# Patient Record
Sex: FEMALE | Race: BLACK OR AFRICAN AMERICAN | Employment: FULL TIME | URBAN - METROPOLITAN AREA
[De-identification: names, ages, dates, MRNs, and addresses within clinical notes are randomized per-mention and may not be internally consistent; named-entity substitution may affect disease eponyms.]

---

## 2021-09-15 ENCOUNTER — HOSPITAL ENCOUNTER (EMERGENCY)
Facility: HOSPITAL | Age: 36
Discharge: HOME | End: 2021-09-15
Attending: EMERGENCY MEDICINE
Payer: COMMERCIAL

## 2021-09-15 VITALS
HEIGHT: 68 IN | OXYGEN SATURATION: 99 % | BODY MASS INDEX: 28.04 KG/M2 | RESPIRATION RATE: 15 BRPM | WEIGHT: 185 LBS | DIASTOLIC BLOOD PRESSURE: 66 MMHG | SYSTOLIC BLOOD PRESSURE: 111 MMHG | HEART RATE: 87 BPM | TEMPERATURE: 98 F

## 2021-09-15 DIAGNOSIS — F32.A DEPRESSION, UNSPECIFIED DEPRESSION TYPE: Primary | ICD-10-CM

## 2021-09-15 LAB — B-HCG UR QL: NEGATIVE

## 2021-09-15 PROCEDURE — 81025 URINE PREGNANCY TEST: CPT

## 2021-09-15 PROCEDURE — 99284 EMERGENCY DEPT VISIT MOD MDM: CPT

## 2021-09-15 RX ORDER — ESCITALOPRAM OXALATE 10 MG/1
10 TABLET ORAL DAILY
Qty: 30 TABLET | Refills: 2 | Status: SHIPPED | OUTPATIENT
Start: 2021-09-15 | End: 2021-10-15

## 2021-09-15 RX ORDER — MAGNESIUM HYDROXIDE/ALUMINUM HYDROXICE/SIMETHICONE 120; 1200; 1200 MG/30ML; MG/30ML; MG/30ML
30 SUSPENSION ORAL ONCE
Status: DISCONTINUED | OUTPATIENT
Start: 2021-09-15 | End: 2021-09-16

## 2021-09-15 RX ORDER — FAMOTIDINE 20 MG/1
20 TABLET ORAL ONCE
Status: COMPLETED | OUTPATIENT
Start: 2021-09-15 | End: 2021-09-15

## 2021-09-16 NOTE — ED INITIAL ASSESSMENT (HPI)
Patient states, \"I need to be on an anti-depressant\"  Patient denies si/hi - daughter is very ill in the hospital and patient feels overwhelmed and sad.

## 2021-09-16 NOTE — ED QUICK NOTES
Patient requesting medication for \"heartburn\". Patient also requesting pregnancy testing. MD aware.

## 2021-09-16 NOTE — BH LEVEL OF CARE ASSESSMENT
Crisis Evaluation Assessment    Jermain Martinez YOB: 1985   Age 38year old MRN [id number]   Location [facility] Attending Mariajose Hassan MD      Patient's legal sex: female  Patient identifies as: female  P denied. Is your experience of thoughts of dying by suicide:  Other (Patient denied and kept talking about stressors.)  Protective Factors: Laura Roberts (friend), 3 children, hugs, eating gummy bears, Marlena (counselor), [employer]  Past Suicidal Ideation: full-time with a very understanding supervisor. Patient reported having a take off time due to daughter's health issues, and coming close to needing FMLA. Patient currently lives by self with 3 teenage daughters.   Patient reported potential need to move, Relaxed  Rate of Movement: Normal  Mood and Affect  Mood or Feelings: Sadness; Depressed; Stressed  Appropriateness of Affect: Congruent to mood; Appropriate to situation  Range of Affect: Flat  Stability of Affect: Stable  Attitude toward staff:  Withdrawn in crying, and decrease in appetite. Patient reported eating 1 meal per day, and sleeping 1 hour since Monday. Patient denied psychosis.   Writer consulted with MD following patient completing safety plan, due to concerns about patient's lack of sleep and

## 2021-09-16 NOTE — ED QUICK NOTES
Patient cleared for discharge. Received belongings from [employer]. Patient verbalized understanding of discharge instructions.

## 2021-09-16 NOTE — ED PROVIDER NOTES
Patient Seen in: [facility] AND [facility] Emergency Department    History   Patient presents with:  Joceline-MIRANDA      HPI    Patient presents to the ED complaining of depression due to increase stress in her life recently.   She denies any suicidal or homicidal ideati effort is normal. No respiratory distress. Skin:     General: Skin is warm and dry. Neurological:      Mental Status: She is alert and oriented to person, place, and time.    Psychiatric:         Mood and Affect: Mood normal.         Behavior: Behavior worsen      Medications Prescribed:  Current Discharge Medication List    START taking these medications    escitalopram 10 MG Oral Tab  Take 1 tablet (10 mg total) by mouth daily.   Qty: 30 tablet Refills: 2
